# Patient Record
Sex: MALE | Race: WHITE | URBAN - METROPOLITAN AREA
[De-identification: names, ages, dates, MRNs, and addresses within clinical notes are randomized per-mention and may not be internally consistent; named-entity substitution may affect disease eponyms.]

---

## 2020-08-26 ENCOUNTER — OFFICE VISIT (OUTPATIENT)
Dept: URBAN - METROPOLITAN AREA CLINIC 48 | Facility: CLINIC | Age: 70
End: 2020-08-26
Payer: MEDICARE

## 2020-08-26 DIAGNOSIS — H26.491 OTHER SECONDARY CATARACT, RIGHT EYE: Primary | ICD-10-CM

## 2020-08-26 PROCEDURE — 99204 OFFICE O/P NEW MOD 45 MIN: CPT | Performed by: OPHTHALMOLOGY

## 2020-08-26 ASSESSMENT — VISUAL ACUITY
OS: 20/20
OD: 20/20

## 2020-08-26 ASSESSMENT — INTRAOCULAR PRESSURE
OS: 17
OD: 10

## 2020-08-26 NOTE — IMPRESSION/PLAN
Impression: Other secondary cataract, right eye: H26.491. Plan: Risks, benefits, alternatives, and expectations of YAG capsulotomy were discussed. The patient desires a YAG capsulotomy in the right eye. Schedule YAG in the right eye.

## 2020-09-23 ENCOUNTER — SURGERY (OUTPATIENT)
Dept: URBAN - METROPOLITAN AREA SURGERY 26 | Facility: SURGERY | Age: 70
End: 2020-09-23
Payer: MEDICARE

## 2020-09-23 PROCEDURE — 66821 AFTER CATARACT LASER SURGERY: CPT | Performed by: OPHTHALMOLOGY

## 2020-09-30 ENCOUNTER — POST-OPERATIVE VISIT (OUTPATIENT)
Dept: URBAN - METROPOLITAN AREA CLINIC 48 | Facility: CLINIC | Age: 70
End: 2020-09-30
Payer: MEDICARE

## 2020-09-30 DIAGNOSIS — Z48.810 ENCOUNTER FOR SURGICAL AFTERCARE FOLLOWING SURGERY ON A SENSE ORGAN: Primary | ICD-10-CM

## 2020-09-30 PROCEDURE — 99024 POSTOP FOLLOW-UP VISIT: CPT | Performed by: OPTOMETRIST

## 2020-09-30 ASSESSMENT — INTRAOCULAR PRESSURE
OD: 10
OS: 15

## 2020-09-30 NOTE — IMPRESSION/PLAN
Impression: S/P Laser YAG Capsulotomy OD - 7 Days. Encounter for surgical aftercare following surgery on a sense organ  Z48.810.  OCT Mac OD: flat  Plan: Patient to continue care with Dr. Ifeanyi Bliss

## 2020-11-02 ENCOUNTER — OFFICE VISIT (OUTPATIENT)
Dept: URBAN - METROPOLITAN AREA CLINIC 48 | Facility: CLINIC | Age: 70
End: 2020-11-02
Payer: MEDICARE

## 2020-11-02 DIAGNOSIS — E11.3393 TYPE 2 DIAB W MODERATE NONPRLF DIAB RTNOP W/O MACULAR EDEMA, BILATERAL: Primary | ICD-10-CM

## 2020-11-02 PROCEDURE — 92134 CPTRZ OPH DX IMG PST SGM RTA: CPT | Performed by: OPTOMETRIST

## 2020-11-02 PROCEDURE — 92014 COMPRE OPH EXAM EST PT 1/>: CPT | Performed by: OPTOMETRIST

## 2020-11-02 ASSESSMENT — KERATOMETRY
OS: 44.75
OD: 44.38

## 2020-11-02 ASSESSMENT — INTRAOCULAR PRESSURE
OD: 11
OS: 11

## 2020-11-02 NOTE — IMPRESSION/PLAN
Impression: Type 2 diab w moderate nonprlf diab rtnop w/o macular edema, bilateral: C96.7999. Plan: Discussed with patient the importance of glucose control and ocular risk. 

Follow up annually with dilated fundus exam.

## 2020-11-02 NOTE — IMPRESSION/PLAN
Impression: Other subjective visual disturbance: H53.19.
decreased dark adaptation x2 weeks OD: stable, OS: flat Plan: Advised patient to call office immediately for any vision changes. RTC Friday for follow-up VF 24-2 Friday

## 2020-11-06 ENCOUNTER — OFFICE VISIT (OUTPATIENT)
Dept: URBAN - METROPOLITAN AREA CLINIC 48 | Facility: CLINIC | Age: 70
End: 2020-11-06
Payer: MEDICARE

## 2020-11-06 DIAGNOSIS — H53.19 OTHER SUBJECTIVE VISUAL DISTURBANCES: Primary | ICD-10-CM

## 2020-11-06 PROCEDURE — 92014 COMPRE OPH EXAM EST PT 1/>: CPT | Performed by: OPTOMETRIST

## 2020-11-06 PROCEDURE — 92083 EXTENDED VISUAL FIELD XM: CPT | Performed by: OPTOMETRIST

## 2020-11-06 PROCEDURE — 92133 CPTRZD OPH DX IMG PST SGM ON: CPT | Performed by: OPTOMETRIST

## 2020-11-06 ASSESSMENT — INTRAOCULAR PRESSURE
OS: 12
OD: 10

## 2020-11-06 NOTE — IMPRESSION/PLAN
Impression: Other subjective visual disturbance: H53.19. OCT GCL OD: flat, OS: some thinning VF 24-2 OU: restriction, not necessarily indicating pathology, will need to repeat Plan: Advised patient to call office immediately for any vision changes.  

RTC 2 month DFE, OCT-Mac, OCT-GCL with Dr. Rissa Mcfarland

## 2021-01-06 ENCOUNTER — OFFICE VISIT (OUTPATIENT)
Dept: URBAN - METROPOLITAN AREA CLINIC 48 | Facility: CLINIC | Age: 71
End: 2021-01-06
Payer: MEDICARE

## 2021-01-06 DIAGNOSIS — E11.3392 TYPE 2 DIAB W MODERATE NONPRLF DIAB RTNOP W/O MACULAR EDEMA, LEFT EYE: Primary | ICD-10-CM

## 2021-01-06 DIAGNOSIS — H26.492 OTHER SECONDARY CATARACT, LEFT EYE: ICD-10-CM

## 2021-01-06 PROCEDURE — 92014 COMPRE OPH EXAM EST PT 1/>: CPT | Performed by: OPHTHALMOLOGY

## 2021-01-06 ASSESSMENT — INTRAOCULAR PRESSURE
OS: 12
OD: 13

## 2021-01-06 NOTE — IMPRESSION/PLAN
Impression: Type 2 diab w moderate nonprlf diab rtnop w/o macular edema, left eye: N46.2100. Plan: Advised to obtain good blood glucose control RTC 6 months DM/OCT mac ( long exam)

## 2021-01-06 NOTE — IMPRESSION/PLAN
Impression: Dry eye syndrome of bilateral lacrimal glands: H04.123. Plan: Recommend patient to use AFT 1-4 times a day OU ( brand name preferred). Hand out of different Dry eye treatment recommendations given to patient. Recommend Systane eye drops 1-4 times a day OU.

## 2021-01-06 NOTE — IMPRESSION/PLAN
Impression: Other secondary cataract, left eye: H26.492. BAT 20/70 Plan: Risks, benefits, alternatives, and expectations of YAG capsulotomy were discussed. The patient desires a YAG capsulotomy in the left eye. Schedule YAG capsulotomy in the left eye with same day PO1 then 1 week PO with Joya Kennedy  with a quickmanifest at this time.  RL 2

## 2021-01-28 ENCOUNTER — SURGERY (OUTPATIENT)
Dept: URBAN - METROPOLITAN AREA SURGERY 26 | Facility: SURGERY | Age: 71
End: 2021-01-28
Payer: MEDICARE

## 2021-01-28 PROCEDURE — 66821 AFTER CATARACT LASER SURGERY: CPT | Performed by: OPHTHALMOLOGY

## 2021-02-04 ENCOUNTER — POST-OPERATIVE VISIT (OUTPATIENT)
Dept: URBAN - METROPOLITAN AREA CLINIC 48 | Facility: CLINIC | Age: 71
End: 2021-02-04
Payer: MEDICARE

## 2021-02-04 PROCEDURE — 99024 POSTOP FOLLOW-UP VISIT: CPT | Performed by: OPHTHALMOLOGY

## 2021-02-04 ASSESSMENT — INTRAOCULAR PRESSURE
OD: 10
OS: 10

## 2021-02-04 NOTE — IMPRESSION/PLAN
Impression: S/P YAG Capsulotomy (Yttrium Aluminum Red Jacket) OS - 7 Days. Encounter for surgical aftercare following surgery on a sense organ  Z48.810. Excellent post op course   Post operative instructions reviewed - Patient has double vision diagonal for a couple months according to the patient. not related to yag laser. ACT: 
Primary gaze: Ortho, 1-2 LHT
right gaze: 2 XT , LHT flick 
left gaze: 2XT, 2RHT Up gaze: 2XT
down gaze: 2LHT 3-4 XT at close 4 XT at up close Patient bifocal may be missed placed  Plan: low grade diplopia, not sure how long its been there possibly 6 months.   patient Last MRI of the brain a few months in the  Hospital 


RTC 2 months with Dr. Carmen Persons for prism glasses
patient experiences double vision at close

## 2021-04-05 ENCOUNTER — OFFICE VISIT (OUTPATIENT)
Dept: URBAN - METROPOLITAN AREA CLINIC 48 | Facility: CLINIC | Age: 71
End: 2021-04-05
Payer: MEDICARE

## 2021-04-05 DIAGNOSIS — H52.4 PRESBYOPIA: ICD-10-CM

## 2021-04-05 DIAGNOSIS — H53.2 DIPLOPIA: Primary | ICD-10-CM

## 2021-04-05 DIAGNOSIS — H04.123 DRY EYE SYNDROME OF BILATERAL LACRIMAL GLANDS: ICD-10-CM

## 2021-04-05 PROCEDURE — 99214 OFFICE O/P EST MOD 30 MIN: CPT | Performed by: OPTOMETRIST

## 2021-04-05 ASSESSMENT — KERATOMETRY
OD: 44.38
OS: 44.38

## 2021-08-04 ENCOUNTER — OFFICE VISIT (OUTPATIENT)
Dept: URBAN - METROPOLITAN AREA CLINIC 48 | Facility: CLINIC | Age: 71
End: 2021-08-04
Payer: MEDICARE

## 2021-08-04 DIAGNOSIS — E11.9 TYPE 2 DIABETES MELLITUS W/O COMPLICATION: Primary | ICD-10-CM

## 2021-08-04 PROCEDURE — 92014 COMPRE OPH EXAM EST PT 1/>: CPT | Performed by: OPHTHALMOLOGY

## 2021-08-04 PROCEDURE — 92134 CPTRZ OPH DX IMG PST SGM RTA: CPT | Performed by: OPHTHALMOLOGY

## 2021-08-04 ASSESSMENT — INTRAOCULAR PRESSURE
OS: 11
OD: 12

## 2021-08-04 NOTE — IMPRESSION/PLAN
Impression: Puckering of macula, left eye: H35.372. Plan: If patient has vision complaints will refer to Dr. Zohra Bearden RTC 6 months DE/OCT mac

## 2021-08-04 NOTE — IMPRESSION/PLAN
Impression: Type 2 diabetes mellitus w/o complication: E44.9.  Plan: No diabetic retinopathy on today's exam

Recommend 6 month  diabetic exam with Dr. Jeremy Sood or Dr. Robbin Eisenberg

## 2021-10-18 ENCOUNTER — OFFICE VISIT (OUTPATIENT)
Dept: URBAN - METROPOLITAN AREA CLINIC 48 | Facility: CLINIC | Age: 71
End: 2021-10-18
Payer: MEDICARE

## 2021-10-18 PROCEDURE — 99214 OFFICE O/P EST MOD 30 MIN: CPT | Performed by: OPTOMETRIST

## 2021-10-18 ASSESSMENT — INTRAOCULAR PRESSURE
OD: 13
OS: 13

## 2021-10-18 NOTE — IMPRESSION/PLAN
Impression: Type 2 diabetes mellitus w/o complication: I66.8.  Plan: No diabetic retinopathy on today's exam

Recommend 6 month  diabetic exam with Dr. Corrinne Oliva or Dr. Dionte Bai

## 2022-02-18 ENCOUNTER — OFFICE VISIT (OUTPATIENT)
Dept: URBAN - METROPOLITAN AREA CLINIC 48 | Facility: CLINIC | Age: 72
End: 2022-02-18
Payer: MEDICARE

## 2022-02-18 DIAGNOSIS — H35.372 PUCKERING OF MACULA, LEFT EYE: ICD-10-CM

## 2022-02-18 PROCEDURE — 92134 CPTRZ OPH DX IMG PST SGM RTA: CPT | Performed by: OPHTHALMOLOGY

## 2022-02-18 PROCEDURE — 92014 COMPRE OPH EXAM EST PT 1/>: CPT | Performed by: OPHTHALMOLOGY

## 2022-02-18 ASSESSMENT — INTRAOCULAR PRESSURE
OD: 12
OS: 13

## 2022-02-18 NOTE — IMPRESSION/PLAN
Impression: Type 2 diab w moderate nonprlf diab rtnop w/o macular edema, bilateral: B77.9349. OCT MAC Findings;
OD normal age related changes OS ERM Plan: Reviewed and discussed testing with pt. Advise healthy diet, good BS and BP control .

## 2022-09-21 ENCOUNTER — OFFICE VISIT (OUTPATIENT)
Dept: URBAN - METROPOLITAN AREA CLINIC 48 | Facility: CLINIC | Age: 72
End: 2022-09-21
Payer: MEDICARE

## 2022-09-21 DIAGNOSIS — E11.3393 TYPE 2 DIABETES MELLITUS WITH MODERATE NONPROLIFERATIVE DIABETIC RETINOPATHY WITHOUT MACULAR EDEMA, BILATERAL: ICD-10-CM

## 2022-09-21 DIAGNOSIS — E11.3293 DIABETES MELLITUS TYPE 2 WITH MILD NON-PROLIFERATIVE RETINOPATHY WITHOUT MACULAR EDEMA, BILATERAL: Primary | ICD-10-CM

## 2022-09-21 DIAGNOSIS — H04.123 DRY EYE SYNDROME OF BILATERAL LACRIMAL GLANDS: ICD-10-CM

## 2022-09-21 DIAGNOSIS — H35.372 PUCKERING OF MACULA, LEFT EYE: ICD-10-CM

## 2022-09-21 PROCEDURE — 92134 CPTRZ OPH DX IMG PST SGM RTA: CPT | Performed by: OPHTHALMOLOGY

## 2022-09-21 PROCEDURE — 92133 CPTRZD OPH DX IMG PST SGM ON: CPT | Performed by: OPHTHALMOLOGY

## 2022-09-21 PROCEDURE — 99214 OFFICE O/P EST MOD 30 MIN: CPT | Performed by: OPHTHALMOLOGY

## 2022-09-21 ASSESSMENT — INTRAOCULAR PRESSURE
OS: 13
OD: 14

## 2022-09-21 NOTE — IMPRESSION/PLAN
Impression: Dry eye syndrome of bilateral lacrimal glands: H04.123. Plan: Patients complaint of blurred vision on/off consistent with poor tear film. Recommend lubricating eye drops Recommend patient to use AFT 1-4 times a day OU ( brand name preferred). Hand out of different Dry eye treatment recommendations given to patient.

## 2022-09-21 NOTE — IMPRESSION/PLAN
Impression: Diabetes mellitus Type 2 with mild non-proliferative retinopathy without macular edema, bilateral: Z72.1288. Plan: Diabetes type II: mild background diabetic retinopathy, no signs of neovascularization noted. No treatment necessary at this time. Patient was instructed to monitor vision for sudden changes and to call if visual changes noted. Discussed ocular and systemic benefits of blood sugar control. 

RTC 9mo DE/OCT Mac